# Patient Record
Sex: FEMALE | URBAN - METROPOLITAN AREA
[De-identification: names, ages, dates, MRNs, and addresses within clinical notes are randomized per-mention and may not be internally consistent; named-entity substitution may affect disease eponyms.]

---

## 2024-11-22 ENCOUNTER — APPOINTMENT (RX ONLY)
Dept: URBAN - METROPOLITAN AREA CLINIC 35 | Facility: CLINIC | Age: 89
Setting detail: DERMATOLOGY
End: 2024-11-22

## 2024-11-22 DIAGNOSIS — B07.8 OTHER VIRAL WARTS: ICD-10-CM

## 2024-11-22 DIAGNOSIS — L60.0 INGROWING NAIL: ICD-10-CM

## 2024-11-22 DIAGNOSIS — B35.1 TINEA UNGUIUM: ICD-10-CM

## 2024-11-22 PROCEDURE — 17110 DESTRUCTION B9 LES UP TO 14: CPT

## 2024-11-22 PROCEDURE — 99203 OFFICE O/P NEW LOW 30 MIN: CPT | Mod: 25

## 2024-11-22 PROCEDURE — ? COUNSELING

## 2024-11-22 PROCEDURE — ? NAIL CLIPPING FOR PAS

## 2024-11-22 PROCEDURE — ? LIQUID NITROGEN

## 2024-11-22 PROCEDURE — ? DIAGNOSIS COMMENT

## 2024-11-22 ASSESSMENT — LOCATION DETAILED DESCRIPTION DERM
LOCATION DETAILED: PERIUNGUAL SKIN RIGHT THUMB
LOCATION DETAILED: RIGHT GREAT TOENAIL
LOCATION DETAILED: RIGHT DISTAL PLANTAR 2ND TOE
LOCATION DETAILED: RIGHT 2ND TOENAIL

## 2024-11-22 ASSESSMENT — LOCATION ZONE DERM
LOCATION ZONE: TOE
LOCATION ZONE: TOENAIL
LOCATION ZONE: FINGER

## 2024-11-22 ASSESSMENT — LOCATION SIMPLE DESCRIPTION DERM
LOCATION SIMPLE: RIGHT GREAT TOE
LOCATION SIMPLE: RIGHT THUMB
LOCATION SIMPLE: RIGHT 2ND TOE

## 2024-11-22 NOTE — PROCEDURE: COUNSELING
Topical Salicylic Acid Recommendations: Soak affected area in warm water for 5-10 minutes to soften the warts. Then use a clean pumice stone or nail file to file the wart until all the white, dead skin is removed without causing discomfort or pain.  Do not share pumice stones or nail files with others since the warts are caused by a virus.\\n\\nApply topical salicylic acid (40%) to the warts and cover lesions with Band-aids or duct tape.\\n\\nRepeat process every night before bed until your next appointment. You can stop sooner if the wart has fallen off or is completely gone.
Detail Level: Detailed

## 2024-11-22 NOTE — PROCEDURE: DIAGNOSIS COMMENT
Comment: Discussed treating with at home SA with duct tape vs. LN2; patient opted for paring and LN2 treatment. Recommended repeating treatment in 6-8 weeks.
Detail Level: Simple
Render Risk Assessment In Note?: no
Comment: Performed nail clipping for PAS; discussed treatment options including Jublia and terbinafine if positive. I recommend Jublia since there are risks associated with systemic terbinafine and efficacy decreases with patient age. If negative for fungus, would recommend Urea cream daily to help thin the toenails.
Comment: Recommended seeing a podiatrist; discussed using a Q-Tip to wedge in between nail and toe to redirect growth

## 2024-11-22 NOTE — HPI: OTHER
Condition:: Nail Issues
Please Describe Your Condition:: Patient presents for issues on 3 nails:\\n\\n- the right thumb has a hard crust on the end of it and patient reports pain that shoots up the thumb and into the hand and arm, PCP prescribed mupirocin which did not help\\n- the right great toe is growing into the medially into the skin, causing pain\\n- the right long toe nail has grown very thick and painful, the patient reports being unable to cut the nail\\n\\nThese issues have been present for over a year.

## 2024-11-22 NOTE — PROCEDURE: NAIL CLIPPING FOR PAS
Detail Level: Detailed
Render Path Notes In Note?: No
Lab: [id number]
Lab Facility: [id number]
Billing Type: Third-Party Bill

## 2024-11-22 NOTE — PROCEDURE: LIQUID NITROGEN
Spray Paint Text: The liquid nitrogen was applied to the skin utilizing a spray paint frosting technique.
Include Z78.9 (Other Specified Conditions Influencing Health Status) As An Associated Diagnosis?: No
Duration Of Freeze Thaw-Cycle (Seconds): 10-15
Post-Care Instructions: I reviewed with the patient in detail post-care instructions. Patient is to wear sunprotection, and avoid picking at any of the treated lesions. Pt may apply Vaseline to crusted or scabbing areas.
Show Spray Paint Technique Variable?: Yes
Detail Level: Detailed
Medical Necessity Information: It is in your best interest to select a reason for this procedure from the list below. All of these items fulfill various CMS LCD requirements except the new and changing color options.
Medical Necessity Clause: This procedure was medically necessary because the lesions that were treated were:
Number Of Freeze-Thaw Cycles: 2 freeze-thaw cycles
Consent: The patient's consent was obtained including but not limited to risks of crusting, scabbing, blistering, scarring, darker or lighter pigmentary change, recurrence, incomplete removal and infection.
Pared With?: curette

## 2024-12-10 ENCOUNTER — APPOINTMENT (RX ONLY)
Dept: URBAN - METROPOLITAN AREA CLINIC 35 | Facility: CLINIC | Age: 89
Setting detail: DERMATOLOGY
End: 2024-12-10

## 2024-12-10 DIAGNOSIS — L60.3 NAIL DYSTROPHY: ICD-10-CM

## 2024-12-10 DIAGNOSIS — B07.8 OTHER VIRAL WARTS: ICD-10-CM

## 2024-12-10 PROCEDURE — ? DIAGNOSIS COMMENT

## 2024-12-10 PROCEDURE — 99213 OFFICE O/P EST LOW 20 MIN: CPT | Mod: 25

## 2024-12-10 PROCEDURE — ? LIQUID NITROGEN

## 2024-12-10 PROCEDURE — ? COUNSELING

## 2024-12-10 PROCEDURE — 17110 DESTRUCTION B9 LES UP TO 14: CPT

## 2024-12-10 ASSESSMENT — LOCATION ZONE DERM
LOCATION ZONE: FINGERNAIL
LOCATION ZONE: TOE
LOCATION ZONE: FINGER

## 2024-12-10 ASSESSMENT — LOCATION SIMPLE DESCRIPTION DERM
LOCATION SIMPLE: RIGHT THUMBNAIL
LOCATION SIMPLE: RIGHT THUMB
LOCATION SIMPLE: RIGHT 2ND TOE

## 2024-12-10 ASSESSMENT — LOCATION DETAILED DESCRIPTION DERM
LOCATION DETAILED: PERIUNGUAL SKIN RIGHT THUMB
LOCATION DETAILED: RIGHT THUMBNAIL
LOCATION DETAILED: RIGHT DORSAL 2ND TOE

## 2024-12-10 NOTE — PROCEDURE: LIQUID NITROGEN
Render Note In Bullet Format When Appropriate: No
Medical Necessity Clause: This procedure was medically necessary because the lesions that were treated were:
Show Spray Paint Technique Variable?: Yes
Spray Paint Text: The liquid nitrogen was applied to the skin utilizing a spray paint frosting technique.
Detail Level: Detailed
Post-Care Instructions: I reviewed with the patient in detail post-care instructions. Patient is to wear sunprotection, and avoid picking at any of the treated lesions. Pt may apply Vaseline to crusted or scabbing areas.
Duration Of Freeze Thaw-Cycle (Seconds): 10-15
Medical Necessity Information: It is in your best interest to select a reason for this procedure from the list below. All of these items fulfill various CMS LCD requirements except the new and changing color options.
Consent: The patient's consent was obtained including but not limited to risks of crusting, scabbing, blistering, scarring, darker or lighter pigmentary change, recurrence, incomplete removal and infection.
Pared With?: curette
Number Of Freeze-Thaw Cycles: 2 freeze-thaw cycles

## 2024-12-10 NOTE — PROCEDURE: DIAGNOSIS COMMENT
Comment: Rt thumb
Render Risk Assessment In Note?: no
Detail Level: Simple
Comment: Patient was seen in clinic today. Counseled on result of toenail clipping which was negative for fungal infection. No antifungal treatment is necessary but as previously discussed Urea cream can be used to help with toenail thickening/dystrophy.